# Patient Record
Sex: FEMALE | Race: WHITE | Employment: OTHER | URBAN - METROPOLITAN AREA
[De-identification: names, ages, dates, MRNs, and addresses within clinical notes are randomized per-mention and may not be internally consistent; named-entity substitution may affect disease eponyms.]

---

## 2018-03-20 PROCEDURE — 87510 GARDNER VAG DNA DIR PROBE: CPT | Performed by: OBSTETRICS & GYNECOLOGY

## 2018-03-20 PROCEDURE — 87480 CANDIDA DNA DIR PROBE: CPT | Performed by: OBSTETRICS & GYNECOLOGY

## 2018-03-20 PROCEDURE — 87660 TRICHOMONAS VAGIN DIR PROBE: CPT | Performed by: OBSTETRICS & GYNECOLOGY

## 2018-03-20 PROCEDURE — 87086 URINE CULTURE/COLONY COUNT: CPT | Performed by: OBSTETRICS & GYNECOLOGY

## 2018-07-13 PROCEDURE — 87045 FECES CULTURE AEROBIC BACT: CPT | Performed by: INTERNAL MEDICINE

## 2018-07-13 PROCEDURE — 87493 C DIFF AMPLIFIED PROBE: CPT | Performed by: INTERNAL MEDICINE

## 2018-07-13 PROCEDURE — 87046 STOOL CULTR AEROBIC BACT EA: CPT | Performed by: INTERNAL MEDICINE

## 2020-02-04 ENCOUNTER — APPOINTMENT (OUTPATIENT)
Dept: LAB | Age: 75
End: 2020-02-04
Attending: DERMATOLOGY
Payer: MEDICARE

## 2020-02-04 DIAGNOSIS — D48.5 NEOPLASM OF UNCERTAIN BEHAVIOR OF SKIN: ICD-10-CM

## 2020-02-04 PROCEDURE — 88342 IMHCHEM/IMCYTCHM 1ST ANTB: CPT

## 2020-02-04 PROCEDURE — 88341 IMHCHEM/IMCYTCHM EA ADD ANTB: CPT

## 2021-04-23 PROBLEM — I25.84 CORONARY ARTERY CALCIFICATION: Status: ACTIVE | Noted: 2021-04-23

## 2021-04-23 PROBLEM — I70.0 AORTIC ATHEROSCLEROSIS (HCC): Status: ACTIVE | Noted: 2021-04-23

## 2021-04-23 PROBLEM — I25.10 CORONARY ARTERY CALCIFICATION: Status: ACTIVE | Noted: 2021-04-23

## 2024-02-09 ENCOUNTER — LAB ENCOUNTER (OUTPATIENT)
Dept: LAB | Age: 79
End: 2024-02-09
Attending: OBSTETRICS & GYNECOLOGY
Payer: MEDICARE

## 2024-02-09 DIAGNOSIS — Z01.818 PREOP TESTING: ICD-10-CM

## 2024-02-09 LAB
ANTIBODY SCREEN: NEGATIVE
RH BLOOD TYPE: POSITIVE
RH BLOOD TYPE: POSITIVE

## 2024-02-09 PROCEDURE — 86900 BLOOD TYPING SEROLOGIC ABO: CPT

## 2024-02-09 PROCEDURE — 86901 BLOOD TYPING SEROLOGIC RH(D): CPT

## 2024-02-09 PROCEDURE — 86850 RBC ANTIBODY SCREEN: CPT

## 2024-02-13 ENCOUNTER — ANESTHESIA (OUTPATIENT)
Dept: SURGERY | Facility: HOSPITAL | Age: 79
End: 2024-02-13
Payer: MEDICARE

## 2024-02-13 ENCOUNTER — HOSPITAL ENCOUNTER (INPATIENT)
Facility: HOSPITAL | Age: 79
LOS: 1 days | Discharge: HOME | End: 2024-02-14
Attending: OBSTETRICS & GYNECOLOGY | Admitting: OBSTETRICS & GYNECOLOGY
Payer: MEDICARE

## 2024-02-13 ENCOUNTER — ANESTHESIA EVENT (OUTPATIENT)
Dept: SURGERY | Facility: HOSPITAL | Age: 79
End: 2024-02-13
Payer: MEDICARE

## 2024-02-13 DIAGNOSIS — Z01.818 PREOP TESTING: Primary | ICD-10-CM

## 2024-02-13 PROBLEM — C54.1 ENDOMETRIAL CANCER (HCC): Status: ACTIVE | Noted: 2024-02-13

## 2024-02-13 PROCEDURE — 07BC4ZZ EXCISION OF PELVIS LYMPHATIC, PERCUTANEOUS ENDOSCOPIC APPROACH: ICD-10-PCS | Performed by: OBSTETRICS & GYNECOLOGY

## 2024-02-13 PROCEDURE — 0UT9FZZ RESECTION OF UTERUS, VIA NATURAL OR ARTIFICIAL OPENING WITH PERCUTANEOUS ENDOSCOPIC ASSISTANCE: ICD-10-PCS | Performed by: OBSTETRICS & GYNECOLOGY

## 2024-02-13 PROCEDURE — 88341 IMHCHEM/IMCYTCHM EA ADD ANTB: CPT | Performed by: OBSTETRICS & GYNECOLOGY

## 2024-02-13 PROCEDURE — 88309 TISSUE EXAM BY PATHOLOGIST: CPT | Performed by: OBSTETRICS & GYNECOLOGY

## 2024-02-13 PROCEDURE — 88112 CYTOPATH CELL ENHANCE TECH: CPT | Performed by: OBSTETRICS & GYNECOLOGY

## 2024-02-13 PROCEDURE — 94799 UNLISTED PULMONARY SVC/PX: CPT

## 2024-02-13 PROCEDURE — 0UT24ZZ RESECTION OF BILATERAL OVARIES, PERCUTANEOUS ENDOSCOPIC APPROACH: ICD-10-PCS | Performed by: OBSTETRICS & GYNECOLOGY

## 2024-02-13 PROCEDURE — 88342 IMHCHEM/IMCYTCHM 1ST ANTB: CPT | Performed by: OBSTETRICS & GYNECOLOGY

## 2024-02-13 PROCEDURE — 8E0W4CZ ROBOTIC ASSISTED PROCEDURE OF TRUNK REGION, PERCUTANEOUS ENDOSCOPIC APPROACH: ICD-10-PCS | Performed by: OBSTETRICS & GYNECOLOGY

## 2024-02-13 PROCEDURE — 88307 TISSUE EXAM BY PATHOLOGIST: CPT | Performed by: OBSTETRICS & GYNECOLOGY

## 2024-02-13 PROCEDURE — 0UT74ZZ RESECTION OF BILATERAL FALLOPIAN TUBES, PERCUTANEOUS ENDOSCOPIC APPROACH: ICD-10-PCS | Performed by: OBSTETRICS & GYNECOLOGY

## 2024-02-13 RX ORDER — TRAMADOL HYDROCHLORIDE 50 MG/1
50 TABLET ORAL EVERY 6 HOURS PRN
Status: DISCONTINUED | OUTPATIENT
Start: 2024-02-13 | End: 2024-02-14

## 2024-02-13 RX ORDER — DEXAMETHASONE SODIUM PHOSPHATE 4 MG/ML
VIAL (ML) INJECTION AS NEEDED
Status: DISCONTINUED | OUTPATIENT
Start: 2024-02-13 | End: 2024-02-13 | Stop reason: SURG

## 2024-02-13 RX ORDER — ONDANSETRON 2 MG/ML
4 INJECTION INTRAMUSCULAR; INTRAVENOUS EVERY 6 HOURS PRN
Status: DISCONTINUED | OUTPATIENT
Start: 2024-02-13 | End: 2024-02-13 | Stop reason: HOSPADM

## 2024-02-13 RX ORDER — PHENYLEPHRINE HCL 10 MG/ML
VIAL (ML) INJECTION AS NEEDED
Status: DISCONTINUED | OUTPATIENT
Start: 2024-02-13 | End: 2024-02-13 | Stop reason: SURG

## 2024-02-13 RX ORDER — METOCLOPRAMIDE HYDROCHLORIDE 5 MG/ML
10 INJECTION INTRAMUSCULAR; INTRAVENOUS ONCE
Status: COMPLETED | OUTPATIENT
Start: 2024-02-13 | End: 2024-02-13

## 2024-02-13 RX ORDER — INDOCYANINE GREEN AND WATER 25 MG
KIT INJECTION AS NEEDED
Status: DISCONTINUED | OUTPATIENT
Start: 2024-02-13 | End: 2024-02-13 | Stop reason: HOSPADM

## 2024-02-13 RX ORDER — LIDOCAINE HYDROCHLORIDE 10 MG/ML
INJECTION, SOLUTION EPIDURAL; INFILTRATION; INTRACAUDAL; PERINEURAL AS NEEDED
Status: DISCONTINUED | OUTPATIENT
Start: 2024-02-13 | End: 2024-02-13 | Stop reason: SURG

## 2024-02-13 RX ORDER — BUPIVACAINE HYDROCHLORIDE 2.5 MG/ML
INJECTION, SOLUTION EPIDURAL; INFILTRATION; INTRACAUDAL AS NEEDED
Status: DISCONTINUED | OUTPATIENT
Start: 2024-02-13 | End: 2024-02-13 | Stop reason: HOSPADM

## 2024-02-13 RX ORDER — NALOXONE HYDROCHLORIDE 0.4 MG/ML
80 INJECTION, SOLUTION INTRAMUSCULAR; INTRAVENOUS; SUBCUTANEOUS AS NEEDED
Status: DISCONTINUED | OUTPATIENT
Start: 2024-02-13 | End: 2024-02-13 | Stop reason: HOSPADM

## 2024-02-13 RX ORDER — HYDROMORPHONE HYDROCHLORIDE 1 MG/ML
0.4 INJECTION, SOLUTION INTRAMUSCULAR; INTRAVENOUS; SUBCUTANEOUS EVERY 2 HOUR PRN
Status: DISCONTINUED | OUTPATIENT
Start: 2024-02-13 | End: 2024-02-14

## 2024-02-13 RX ORDER — IBUPROFEN 600 MG/1
600 TABLET ORAL EVERY 6 HOURS PRN
Status: DISCONTINUED | OUTPATIENT
Start: 2024-02-13 | End: 2024-02-14

## 2024-02-13 RX ORDER — CEFAZOLIN SODIUM IN 0.9 % NACL 3 G/100 ML
3 INTRAVENOUS SOLUTION, PIGGYBACK (ML) INTRAVENOUS EVERY 8 HOURS
Qty: 200 ML | Refills: 0 | Status: COMPLETED | OUTPATIENT
Start: 2024-02-13 | End: 2024-02-14

## 2024-02-13 RX ORDER — ONDANSETRON 2 MG/ML
INJECTION INTRAMUSCULAR; INTRAVENOUS AS NEEDED
Status: DISCONTINUED | OUTPATIENT
Start: 2024-02-13 | End: 2024-02-13 | Stop reason: SURG

## 2024-02-13 RX ORDER — ENOXAPARIN SODIUM 100 MG/ML
40 INJECTION SUBCUTANEOUS DAILY
Status: DISCONTINUED | OUTPATIENT
Start: 2024-02-14 | End: 2024-02-14

## 2024-02-13 RX ORDER — MORPHINE SULFATE 4 MG/ML
2 INJECTION, SOLUTION INTRAMUSCULAR; INTRAVENOUS EVERY 10 MIN PRN
Status: DISCONTINUED | OUTPATIENT
Start: 2024-02-13 | End: 2024-02-13 | Stop reason: HOSPADM

## 2024-02-13 RX ORDER — SODIUM CHLORIDE, SODIUM LACTATE, POTASSIUM CHLORIDE, CALCIUM CHLORIDE 600; 310; 30; 20 MG/100ML; MG/100ML; MG/100ML; MG/100ML
INJECTION, SOLUTION INTRAVENOUS CONTINUOUS
Status: DISCONTINUED | OUTPATIENT
Start: 2024-02-13 | End: 2024-02-13 | Stop reason: HOSPADM

## 2024-02-13 RX ORDER — HYDROMORPHONE HYDROCHLORIDE 1 MG/ML
0.2 INJECTION, SOLUTION INTRAMUSCULAR; INTRAVENOUS; SUBCUTANEOUS EVERY 2 HOUR PRN
Status: DISCONTINUED | OUTPATIENT
Start: 2024-02-13 | End: 2024-02-14

## 2024-02-13 RX ORDER — METOPROLOL TARTRATE 1 MG/ML
2.5 INJECTION, SOLUTION INTRAVENOUS ONCE
Status: DISCONTINUED | OUTPATIENT
Start: 2024-02-13 | End: 2024-02-13 | Stop reason: HOSPADM

## 2024-02-13 RX ORDER — HYDROMORPHONE HYDROCHLORIDE 1 MG/ML
0.8 INJECTION, SOLUTION INTRAMUSCULAR; INTRAVENOUS; SUBCUTANEOUS EVERY 2 HOUR PRN
Status: DISCONTINUED | OUTPATIENT
Start: 2024-02-13 | End: 2024-02-14

## 2024-02-13 RX ORDER — HYDROMORPHONE HYDROCHLORIDE 1 MG/ML
0.2 INJECTION, SOLUTION INTRAMUSCULAR; INTRAVENOUS; SUBCUTANEOUS EVERY 5 MIN PRN
Status: DISCONTINUED | OUTPATIENT
Start: 2024-02-13 | End: 2024-02-13 | Stop reason: HOSPADM

## 2024-02-13 RX ORDER — CEFAZOLIN SODIUM IN 0.9 % NACL 3 G/100 ML
3 INTRAVENOUS SOLUTION, PIGGYBACK (ML) INTRAVENOUS ONCE
Status: COMPLETED | OUTPATIENT
Start: 2024-02-13 | End: 2024-02-13

## 2024-02-13 RX ORDER — SODIUM CHLORIDE, SODIUM LACTATE, POTASSIUM CHLORIDE, CALCIUM CHLORIDE 600; 310; 30; 20 MG/100ML; MG/100ML; MG/100ML; MG/100ML
INJECTION, SOLUTION INTRAVENOUS CONTINUOUS
Status: DISCONTINUED | OUTPATIENT
Start: 2024-02-13 | End: 2024-02-14

## 2024-02-13 RX ORDER — SIMETHICONE 80 MG
80 TABLET,CHEWABLE ORAL 3 TIMES DAILY PRN
Status: DISCONTINUED | OUTPATIENT
Start: 2024-02-13 | End: 2024-02-14

## 2024-02-13 RX ORDER — MORPHINE SULFATE 10 MG/ML
6 INJECTION, SOLUTION INTRAMUSCULAR; INTRAVENOUS EVERY 10 MIN PRN
Status: DISCONTINUED | OUTPATIENT
Start: 2024-02-13 | End: 2024-02-13 | Stop reason: HOSPADM

## 2024-02-13 RX ORDER — HYDROMORPHONE HYDROCHLORIDE 1 MG/ML
0.6 INJECTION, SOLUTION INTRAMUSCULAR; INTRAVENOUS; SUBCUTANEOUS EVERY 5 MIN PRN
Status: DISCONTINUED | OUTPATIENT
Start: 2024-02-13 | End: 2024-02-13 | Stop reason: HOSPADM

## 2024-02-13 RX ORDER — ONDANSETRON 4 MG/1
4 TABLET, FILM COATED ORAL EVERY 8 HOURS PRN
Status: DISCONTINUED | OUTPATIENT
Start: 2024-02-13 | End: 2024-02-14

## 2024-02-13 RX ORDER — FAMOTIDINE 10 MG/ML
20 INJECTION, SOLUTION INTRAVENOUS ONCE
Status: COMPLETED | OUTPATIENT
Start: 2024-02-13 | End: 2024-02-13

## 2024-02-13 RX ORDER — HYDROCODONE BITARTRATE AND ACETAMINOPHEN 5; 325 MG/1; MG/1
1 TABLET ORAL EVERY 6 HOURS PRN
Status: DISCONTINUED | OUTPATIENT
Start: 2024-02-13 | End: 2024-02-13

## 2024-02-13 RX ORDER — FAMOTIDINE 20 MG/1
20 TABLET, FILM COATED ORAL ONCE
Status: COMPLETED | OUTPATIENT
Start: 2024-02-13 | End: 2024-02-13

## 2024-02-13 RX ORDER — GLYCOPYRROLATE 0.2 MG/ML
INJECTION, SOLUTION INTRAMUSCULAR; INTRAVENOUS AS NEEDED
Status: DISCONTINUED | OUTPATIENT
Start: 2024-02-13 | End: 2024-02-13 | Stop reason: SURG

## 2024-02-13 RX ORDER — EPHEDRINE SULFATE 50 MG/ML
INJECTION, SOLUTION INTRAVENOUS AS NEEDED
Status: DISCONTINUED | OUTPATIENT
Start: 2024-02-13 | End: 2024-02-13 | Stop reason: SURG

## 2024-02-13 RX ORDER — ROCURONIUM BROMIDE 10 MG/ML
INJECTION, SOLUTION INTRAVENOUS AS NEEDED
Status: DISCONTINUED | OUTPATIENT
Start: 2024-02-13 | End: 2024-02-13 | Stop reason: SURG

## 2024-02-13 RX ORDER — MORPHINE SULFATE 4 MG/ML
4 INJECTION, SOLUTION INTRAMUSCULAR; INTRAVENOUS EVERY 10 MIN PRN
Status: DISCONTINUED | OUTPATIENT
Start: 2024-02-13 | End: 2024-02-13 | Stop reason: HOSPADM

## 2024-02-13 RX ORDER — ACETAMINOPHEN 500 MG
1000 TABLET ORAL ONCE
Status: COMPLETED | OUTPATIENT
Start: 2024-02-13 | End: 2024-02-13

## 2024-02-13 RX ORDER — DIPHENHYDRAMINE HYDROCHLORIDE 50 MG/ML
12.5 INJECTION INTRAMUSCULAR; INTRAVENOUS EVERY 4 HOURS PRN
Status: DISCONTINUED | OUTPATIENT
Start: 2024-02-13 | End: 2024-02-14

## 2024-02-13 RX ORDER — METOPROLOL SUCCINATE 25 MG/1
25 TABLET, EXTENDED RELEASE ORAL NIGHTLY
Status: DISCONTINUED | OUTPATIENT
Start: 2024-02-13 | End: 2024-02-14

## 2024-02-13 RX ORDER — ACETAMINOPHEN 325 MG/1
650 TABLET ORAL EVERY 4 HOURS PRN
Status: DISCONTINUED | OUTPATIENT
Start: 2024-02-13 | End: 2024-02-14

## 2024-02-13 RX ORDER — HYDROMORPHONE HYDROCHLORIDE 1 MG/ML
0.4 INJECTION, SOLUTION INTRAMUSCULAR; INTRAVENOUS; SUBCUTANEOUS EVERY 5 MIN PRN
Status: DISCONTINUED | OUTPATIENT
Start: 2024-02-13 | End: 2024-02-13 | Stop reason: HOSPADM

## 2024-02-13 RX ORDER — ONDANSETRON 2 MG/ML
4 INJECTION INTRAMUSCULAR; INTRAVENOUS EVERY 8 HOURS PRN
Status: DISCONTINUED | OUTPATIENT
Start: 2024-02-13 | End: 2024-02-14

## 2024-02-13 RX ORDER — DOCUSATE SODIUM 100 MG/1
100 CAPSULE, LIQUID FILLED ORAL 2 TIMES DAILY
Status: DISCONTINUED | OUTPATIENT
Start: 2024-02-14 | End: 2024-02-14

## 2024-02-13 RX ORDER — METOCLOPRAMIDE HYDROCHLORIDE 5 MG/ML
10 INJECTION INTRAMUSCULAR; INTRAVENOUS EVERY 8 HOURS PRN
Status: DISCONTINUED | OUTPATIENT
Start: 2024-02-13 | End: 2024-02-13 | Stop reason: HOSPADM

## 2024-02-13 RX ORDER — METOCLOPRAMIDE 10 MG/1
10 TABLET ORAL ONCE
Status: COMPLETED | OUTPATIENT
Start: 2024-02-13 | End: 2024-02-13

## 2024-02-13 RX ADMIN — DEXAMETHASONE SODIUM PHOSPHATE 4 MG: 4 MG/ML VIAL (ML) INJECTION at 07:37:00

## 2024-02-13 RX ADMIN — PHENYLEPHRINE HCL 100 MCG: 10 MG/ML VIAL (ML) INJECTION at 08:09:00

## 2024-02-13 RX ADMIN — ROCURONIUM BROMIDE 20 MG: 10 INJECTION, SOLUTION INTRAVENOUS at 08:56:00

## 2024-02-13 RX ADMIN — CEFAZOLIN SODIUM IN 0.9 % NACL 3 G: 3 G/100 ML INTRAVENOUS SOLUTION, PIGGYBACK (ML) INTRAVENOUS at 07:44:00

## 2024-02-13 RX ADMIN — LIDOCAINE HYDROCHLORIDE 50 MG: 10 INJECTION, SOLUTION EPIDURAL; INFILTRATION; INTRACAUDAL; PERINEURAL at 07:37:00

## 2024-02-13 RX ADMIN — ROCURONIUM BROMIDE 20 MG: 10 INJECTION, SOLUTION INTRAVENOUS at 08:01:00

## 2024-02-13 RX ADMIN — ONDANSETRON 4 MG: 2 INJECTION INTRAMUSCULAR; INTRAVENOUS at 09:29:00

## 2024-02-13 RX ADMIN — EPHEDRINE SULFATE 10 MG: 50 INJECTION, SOLUTION INTRAVENOUS at 08:21:00

## 2024-02-13 RX ADMIN — SODIUM CHLORIDE, SODIUM LACTATE, POTASSIUM CHLORIDE, CALCIUM CHLORIDE: 600; 310; 30; 20 INJECTION, SOLUTION INTRAVENOUS at 09:47:00

## 2024-02-13 RX ADMIN — SODIUM CHLORIDE, SODIUM LACTATE, POTASSIUM CHLORIDE, CALCIUM CHLORIDE: 600; 310; 30; 20 INJECTION, SOLUTION INTRAVENOUS at 07:36:00

## 2024-02-13 RX ADMIN — GLYCOPYRROLATE 0.2 MG: 0.2 INJECTION, SOLUTION INTRAMUSCULAR; INTRAVENOUS at 07:37:00

## 2024-02-13 RX ADMIN — PHENYLEPHRINE HCL 100 MCG: 10 MG/ML VIAL (ML) INJECTION at 09:04:00

## 2024-02-13 RX ADMIN — SODIUM CHLORIDE, SODIUM LACTATE, POTASSIUM CHLORIDE, CALCIUM CHLORIDE: 600; 310; 30; 20 INJECTION, SOLUTION INTRAVENOUS at 09:06:00

## 2024-02-13 RX ADMIN — ROCURONIUM BROMIDE 50 MG: 10 INJECTION, SOLUTION INTRAVENOUS at 07:37:00

## 2024-02-13 RX ADMIN — PHENYLEPHRINE HCL 100 MCG: 10 MG/ML VIAL (ML) INJECTION at 08:44:00

## 2024-02-13 RX ADMIN — EPHEDRINE SULFATE 10 MG: 50 INJECTION, SOLUTION INTRAVENOUS at 07:55:00

## 2024-02-13 NOTE — PLAN OF CARE
Pt received from OR vis cart,, a/o x 4, vss. Discussed POC, offered emotional support. Encouraged pt to use call light for assistance. Pt with bennett catheter, francisco pad in place. Call light and belongings with in reach, side rails up x 3, bed alarm on. Encouraged pt to sit in chair for meals.   Problem: Patient Centered Care  Goal: Patient preferences are identified and integrated in the patient's plan of care  Description: Interventions:  - What would you like us to know as we care for you?   - Provide timely, complete, and accurate information to patient/family  - Incorporate patient and family knowledge, values, beliefs, and cultural backgrounds into the planning and delivery of care  - Encourage patient/family to participate in care and decision-making at the level they choose  - Honor patient and family perspectives and choices  Outcome: Progressing     Problem: Patient/Family Goals  Goal: Patient/Family Long Term Goal  Description: Patient's Long Term Goal: - Work with treatment team to develop plan of care  - Utilize medications as appropriate, take prescribed medications as instructed  - Continue following up post discharge as instructed with appropriate physicians     Interventions:  -   - See additional Care Plan goals for specific interventions  Outcome: Progressing  Goal: Patient/Family Short Term Goal  Description: Patient's Short Term Goal: - Work with treatment team to develop pain control plan  - Utilize PRN medications appropriately  - Use non-pharmacological pain control techniques (deep breathing, guided-imagery, heat/ice)      Interventions:   -   - See additional Care Plan goals for specific interventions  Outcome: Progressing

## 2024-02-13 NOTE — OPERATIVE REPORT
[facility]    PATIENT'S NAME: CASPER NAVARRETE   ATTENDING PHYSICIAN: Avery Mora MD   OPERATING PHYSICIAN: Avery Mora MD   PATIENT ACCOUNT#:   [id number]    LOCATION:  [address] [place] [address]  MEDICAL RECORD #:   [id number]       YOB: 1945  ADMISSION DATE:       02/13/2024      OPERATION DATE:  02/13/2024    OPERATIVE REPORT    PREOPERATIVE DIAGNOSIS:    1.   Endometrial cancer, grade 1.  2.   Morbid obesity.  Body mass index equal to 43.  3.   Atrial fibrillation, on chronic anticoagulation.  POSTOPERATIVE DIAGNOSIS:    1.   Endometrial cancer, grade 1.  2.   Morbid obesity.  Body mass index equal to 43.  3.   Atrial fibrillation, on chronic anticoagulation.  PROCEDURE:  Robotic-assisted total laparoscopic hysterectomy and bilateral salpingo-oophorectomy, bilateral cervical injection of ICG dye, and bilateral sentinel lymph node dissection.    ASSISTANTS:  Daphne Reddy PA-C and Mariama Ramirez CSA    INTRAVENOUS FLUIDS:  1200 mL.    URINE OUTPUT:  400 mL.    ESTIMATED BLOOD LOSS:  50 mL.    DRAINS:  Jiménez catheter.    COMPLICATIONS:  None.    CONDITION:  Stable, to recovery room.    INDICATIONS:  This is an 78-year-old female who had postmenopausal bleeding.  She had an ultrasound which showed diffusely heterogenous uterus.  Endometrial biopsy showed a FIGO grade 1 endometrial cavity.  The patient was subsequently referred to me.  She has atrial fibrillation and left atrial appendage thrombus and is on Eliquis.  I counseled the patient on options.  As she was thought to be a good candidate for minimally invasive approach, she wanted to proceed with definitive surgical management.  She was counseled on risks, benefits, and alternatives, indications.  Informed consent was obtained.  The patient underwent preoperative medical and cardiac clearance, and her Eliquis was held for 5 days prior to the surgery.    FINDINGS:  Laparoscopic evaluation showed upper abdomen had no significant  abnormalities.  The uterus was retroverted.  It was approximately 7 weeks in size.  The ovaries were age-appropriately atrophic.  The patient had good sentinel lymph node mapping bilaterally in the right obturator space and in the left internal iliac region.  The right obturator lymph nodes were fairly bulky although they did not have overt disease.  There was no evidence of any other peritoneal disease.  The patient did have evidence of colonic diverticulosis with some thickening of the colon and some small areas of outpouching with no active infection seen.     OPERATIVE TECHNIQUE:  The patient was taken to the operating room.  She was given general anesthesia.  She was prepped and draped in usual sterile fashion.  Jiménez catheter was inserted.  Cervix was visualized and dilute solution of ICG dye was injected bilaterally at 3 o'clock and 9 o'clock and more superficially at 5 and 7 o'clock.  A medium VCare was introduced under direct visualization after pneumoperitoneum was created in the left upper quadrant at Oneal's point with a Veress needle.  Initial pressure was 3 mmHg.  Abdomen was insufflated with 4 L of CO2 gas.  A 5 mm AirSeal port was placed.  Position was confirmed.  Robotic trocars were placed 3 cm above the umbilicus in the midline, 2 on the left 10 and 20 cm apart and 1 on the right 10 cm apart, and then a 5 mm assistant port in the right lateral midquadrant.  The patient was placed in Trendelenburg position.  The bowel was positioned out of the way.  The balloon from the uterine manipulator was seen in the anterior area and had traversed through the anterior aspect of the uterus.  It was then repositioned under direct visualization and once we accounted for the retroverted nature of the uterus, we were able to place it successfully without any issues for the remainder of the case.  The robot was docked.  Standard instrumentation applied.  Some congenital adhesions of the sigmoid colon to the pelvic  sidewall were taken down.  The round ligaments on both sides were cauterized and cut.  The peritoneum between the round ligament and infundibulopelvic ligament was divided.  Retroperitoneal spaces were opened.  Ureters were identified.  External, internal, and obturator lymph node bundles were opened.  Firefly was used along with sensitive mode to identify the sentinel lymph nodes.  Excellent mapping was seen on both sides.  On the right side, there was an obturator lymph node bundle that was seen although there was a bulky lymph node that was distal to it that was all part of it that was fairly enlarged.  Therefore, this entire lymph node bundle was excised.  Vital structures were seen and preserved.  The lymph nodes were placed in their respective ipsilateral paracolic space.  On the left side, there was good mapping that was seen in the left internal iliac region that extended up to the bifurcation with the common iliac.  This lymph node was excised in its entirety and also placed in the paracolic space.  The infundibulopelvic ligaments were skeletonized, cauterized, and cut.  Anterior leaf of the broad ligament was then taken down.  Bladder was dissected off the pubocervical fascia.  Some mild to moderate scarring was seen in this area of unclear etiology although we were able to work around it and dissect the bladder off completely.  Posterior peritoneum was skeletonized.  Uterine vessels were skeletonized, cauterized, and cut.  Descending branches were similarly cauterized and cut.  Parametria was taken down up to the level of the VCare cup.  Posterior aspect of the VCare cup over the peritoneum was incised.  Colpotomy was made anteriorly, carried down circumferentially.  The specimen was delivered through the vagina.  The sentinel lymph nodes were also delivered through the vagina.  The vagina was then closed with 0 180 V-Loc sutures starting from right lateral edge and working our way to the left then back  to the right side.  Transvaginal examination showed excellent closure of the vagina.  Due to the initial anterior displacement of the manipulator, I did backfill the bladder with approximately 120 mL of saline.  Clear urine was seen, and there was no evidence of any disruption of the bladder or any areas of thinning.  Urine was drained.  Pelvis was irrigated.  No ongoing bleeding was seen.  Excellent hemostasis was seen.  Surgicel was placed in areas of lymph node dissection and along the vaginal cuff.  Robotic instruments were removed.  Robot was undocked.  Cannulas were removed under direct visualization.  AirSeal was used to deflate the abdomen.  Incisions were closed with subcuticular 4-0 Vicryl suture along with Dermabond.  Transvaginal examination showed no evidence of lacerations.  Clear urine was seen in the catheter.  Approximately 30 mL of Marcaine was injected over the incision sites.      I was present and scrubbed for the entire procedure.  Daphne Reddy PA-C, served as assistant and was invaluable in providing traction, countertraction, exposure, and was needed for the safety and efficiency of the procedure.  Patient was extubated and taken to recovery room in satisfactory condition.     Dictated By Avery Mora MD  d: 02/13/2024 09:45:48  t: 02/13/2024 13:50:58  Job [id number]  /    cc: Avery Mora MD

## 2024-02-13 NOTE — ANESTHESIA POSTPROCEDURE EVALUATION
Patient: Carolynn David    Procedure Summary       Date: 02/13/24 Room / Location: [facility] MAIN OR  / [facility] MAIN OR    Anesthesia Start: 0735 Anesthesia Stop:     Procedures:       Robotic assisted total laparoscopic hysterectomy, bilateral salpingo-oophorectomy, staging (Abdomen)      XI ROBOT-ASSISTED LAPAROSCOPIC OVARIAN CYSTECTOMY/ SALPINGO-OOPHORECTOMY (Bilateral: Abdomen) Diagnosis: (Endometrial cancer)    Surgeons: Avery Mora MD Anesthesiologist: Rayo Barrios MD    Anesthesia Type: general ASA Status: 3            Anesthesia Type: general    Vitals Value Taken Time   /60 02/13/24 0950   Temp 97.1 °F (36.2 °C) 02/13/24 0949   Pulse 73 02/13/24 0950   Resp 18 02/13/24 0950   SpO2 96 % 02/13/24 0950   Vitals shown include unfiled device data.    [facility] AN Post Evaluation:   Patient Evaluated in PACU  Patient Participation: complete - patient participated  Level of Consciousness: sleepy but conscious  Pain Score: 0  Pain Management: adequate  Airway Patency:patent  Dental exam unchanged from preop  Yes    Cardiovascular Status: stable and acceptable  Respiratory Status: acceptable and room air  Postoperative Hydration acceptable      KOKO GARAY CRNA  2/13/2024 9:50 AM

## 2024-02-13 NOTE — ANESTHESIA PREPROCEDURE EVALUATION
Anesthesia PreOp Note    HPI:     Carolynn David is a 78 year old female who presents for preoperative consultation requested by: Avery Mora MD    Date of Surgery: 2/13/2024    Procedure(s):  Robotic assisted total laparoscopic hysterectomy, bilateral salpingo-oophorectomy, staging, possible exploratory laparotomy  XI ROBOT-ASSISTED LAPAROSCOPIC OVARIAN CYSTECTOMY/ SALPINGO-OOPHORECTOMY  Indication: Endometrial cancer    Relevant Problems   No relevant active problems       NPO:  Last Liquid Consumption Date: 02/12/24  Last Liquid Consumption Time: 0400 (sips of water)  Last Solid Consumption Date: 02/12/24  Last Solid Consumption Time: 0800  Last Liquid Consumption Date: 02/12/24          History Review:  Patient Active Problem List    Diagnosis Date Noted    Aortic atherosclerosis (HCC) 04/23/2021    Coronary artery calcification 04/23/2021    Arthritis 04/05/2016    Postmenopausal bleeding 5/16 simple hyperplasia no atypia, & polyps 04/05/2016    Actinic keratosis 09/01/2015    Screening, anemia, deficiency, iron 07/22/2013    Screening for genitourinary condition 07/22/2013    Lipoatrophy 05/24/2012    Atrial fibrillation (HCC) 08/27/2008    Benign hypertensive heart disease without heart failure 08/27/2008    Dyslipidemia, goal LDL below 100 08/27/2008    Obesity, unspecified 08/27/2008    Obstructive sleep apnea syndrome 07/22/2008    Alopecia 07/22/2008       Past Medical History:   Diagnosis Date    ATRIAL FIBRILLATION     Back problem     lumbar disc    Carcinoma in situ in rectal polyp 08/2020    Diarrhea     GERD     Pt not currently on medication    HYPERTENSION     IBS (irritable bowel syndrome)     Incontinence     urine    MENOPAUSE     Migraines     Obesity, unspecified     LAZARO (obstructive sleep apnea)     Osteoarthritis     Skin cancer     Unspecified essential hypertension     Unspecified sleep apnea     Vertigo     Visual impairment     glasses    Vitamin B 12 deficiency        Past  Surgical History:   Procedure Laterality Date    CHOLECYSTECTOMY  4/16    with negative cholangiogram    COLONOSCOPY  08/11/2020    8 cm lobulated villous appearing rectal polyp (pathology showed focus of adenocarcinoma), diverticulosis, random bx taken    COLONOSCOPY & POLYPECTOMY  9/8/05    diverticulosis, adenoma    COLONOSCOPY,DIAGNOSTIC  8/9/11    diverticulosis, random colon bx negativen;    D & C  2008    endometrial polyp    D & C  1990    ENDOSCOPIC ULTRASOUND EXAM  4/10/16    acute cholecystitis without evidence of biliary ductal dilation  or CBD stones    HYSTEROSCOPY      OTHER SURGICAL HISTORY  2002    left knee torn meniscus    OTHER SURGICAL HISTORY  1989    sinus polyps    OTHER SURGICAL HISTORY  1965    hernia    OTHER SURGICAL HISTORY      hysteroscopy and removal of uterine polyp    OTHER SURGICAL HISTORY  5/16    endometrial polyps    SIGMOIDOSCOPY,DIAGNOSTIC  01/12/2021    Rectal scar with some residual polyp (adenoma) at the margin removed    TUBAL LIGATION  1969    UPPER GI ENDOSCOPY,DIAGNOSIS  9/8/05    wnl       Medications Prior to Admission   Medication Sig Dispense Refill Last Dose    apixaban 5 MG Oral Tab Take 1 tablet (5 mg total) by mouth 2 (two) times daily.   2/7/2024 at 2200    metoprolol succinate 25 MG Oral Tablet 24 Hr TAKE ONE TABLET BY MOUTH ONCE DAILY (Patient taking differently: Take 1 tablet (25 mg total) by mouth nightly. TAKE ONE TABLET BY MOUTH ONCE DAILY) 90 tablet 3 2/12/2024 at 2200    DIPHENOXYLATE-ATROPINE 2.5-0.025 MG Oral Tab TAKE ONE TABLET BY MOUTH TWICE DAILY AS NEEDED FOR DIARRHEA 180 tablet 3 Past Week    CHOLESTYRAMINE 4 GM/DOSE Oral Powder USE ONE SCOOP WITH JUICE AT BREAKFAST AND ADJUST DOSE AS NEEDED (Patient taking differently: Take 0.125 packets (0.5 g total) by mouth daily. USE ONE SCOOP WITH JUICE AT BREAKFAST AND ADJUST DOSE AS NEEDED) 378 g 3 2/12/2024 at 0800    TYLENOL 8 HOUR 650 MG OR TBCR Take by mouth.   2/10/2024     Current  Facility-Administered Medications Ordered in Epic   Medication Dose Route Frequency Provider Last Rate Last Admin    lactated ringers infusion   Intravenous Continuous Avery Mora MD 20 mL/hr at 02/13/24 0647 New Bag at 02/13/24 0647    metoprolol tartrate (Lopressor) tab 25 mg  25 mg Oral Once PRN Avery Mora MD        ceFAZolin (Ancef) 3 g in sodium chloride 0.9% 100mL IVPB premix  3 g Intravenous Once Avery Mora MD         No current [facility]-ordered outpatient medications on file.       Allergies   Allergen Reactions    Cortisone OTHER (SEE COMMENTS)     Flu like symptoms    Tuberculin Ppd OTHER (SEE COMMENTS)     Severe flu like symptoms    Wine  [Alcohol] SWELLING    Zoster Vaccine Live OTHER (SEE COMMENTS)    Protonix [Pantoprazole] DIZZINESS     vision changes    Celebrex [Celecoxib] DIARRHEA    Codeine [Opioid Analgesics] OTHER (SEE COMMENTS)     Face went numb    Rythmol [Propafenone Hcl] UNKNOWN     Patient can't recall reaction       Family History   Problem Relation Age of Onset    Stroke Mother     Heart Disease Mother         cvd    Heart Disorder Father         mitral valve - from rhumatic fever    Gastro-Intestinal Disorder Daughter         hep c    Renal Disease Daughter         kidney stones    Heart Disease Maternal Grandfather         stroke    Infectious Disease Paternal Grandmother         poss. flu?    Cancer Paternal Grandfather         throat    Hypertension Brother     Seizure Disorder Brother         sleep apnea    Heart Disease Sister         PE    Heart Disease Daughter         pulm embolism    Hormone Disorder Daughter         obese    Cancer Other         M-AUNT-BREAST    Other (Emboli) Sister         Multiple    Other (Sleep Apnea) Brother         Severe    Breast Cancer Maternal Aunt 55        Age at dx 50's     Social History     Socioeconomic History    Marital status:    Tobacco Use    Smoking status: Never    Smokeless tobacco: Never   Vaping Use    Vaping Use:  Never used   Substance and Sexual Activity    Alcohol use: No    Drug use: No       Available pre-op labs reviewed.             Vital Signs:  Body mass index is 43.14 kg/m².   height is 1.676 m (5' 6\") and weight is 121.2 kg (267 lb 4.8 oz). Her oral temperature is 97.4 °F (36.3 °C). Her blood pressure is 123/76 and her pulse is 87. Her respiration is 20 and oxygen saturation is 96%.   Vitals:    02/08/24 1159 02/13/24 0607   BP:  123/76   Pulse:  87   Resp:  20   Temp:  97.4 °F (36.3 °C)   TempSrc:  Oral   SpO2:  96%   Weight: 122.5 kg (270 lb) 121.2 kg (267 lb 4.8 oz)   Height: 1.676 m (5' 6\") 1.676 m (5' 6\")        Anesthesia Evaluation     Patient summary reviewed and Nursing notes reviewed    Airway   Mallampati: II  TM distance: >3 FB  Neck ROM: full  Dental      Pulmonary - negative ROS and normal exam    breath sounds clear to auscultation  (+) sleep apnea  Cardiovascular - normal exam  Exercise tolerance: good  (+) hypertension, CAD, dysrhythmias    NYHA Classification: I    Neuro/Psych - negative ROS     GI/Hepatic/Renal - negative ROS     Endo/Other - negative ROS   Abdominal  - normal exam     Other findings: Upper right molar a temporary tooth            Anesthesia Plan:   ASA:  3  Plan:   General  Monitors and Lines:   ALETHA  Informed Consent Plan and Risks Discussed With:  Patient  Discussed plan with:  Attending      I have informed Carolynn David and/or legal guardian or family member of the nature of the anesthetic plan, benefits, risks including possible dental damage if relevant, major complications, and any alternative forms of anesthetic management.   All of the patient's questions were answered to the best of my ability. The patient desires the anesthetic management as planned.  HERNÁN LOVE MD  2/13/2024 7:19 AM  Present on Admission:  **None**

## 2024-02-13 NOTE — H&P
DM Hospitalist H&P       CC: No chief complaint on file.       PCP: Joey Delgado MD    Date of Admission: 2/13/2024  5:37 AM    ASSESSMENT / PLAN:       Ms. David is an 79 yo F with PMH of Afib on Eliquis, HTN, LAZARO and endometrial CA who presented for hysterectomy, BSO.    Endometrial CA  S/p robotic assisted total laparoscopic hysterectomy, BSO  - PRN pain meds, Transition to PO when able, wants to avoid narcotics given codeine allergy  - monitor for acute blood loss anemia  - as per gyn onc     Afib  - off Eliquis   - continue metoprolol  - d/w gyn onc, plan to hold Eliquis for 2 weeks, plan to start prophylactic lovenox tomorrow then transition to therapeutic Lovenox in a few days if Hg stable    HTN  - BP stable    LAZARO  - CPAP    FN:  - IVF:  - Diet: ADAT    DVT Prophy: SCD  Lines: PIV    Dispo: pending clinical course    Outpatient records or previous hospital records reviewed.     Further recommendations pending patient's clinical course.  [facility] hospitalist to continue to follow patient while in house    Patient and/or patient's family given opportunity to ask questions and note understanding and agreeing with therapeutic plan as outlined    Mayra Marion MD  [facility] [place] Hospitalist  Answering Service number: [phone]    HPI     History of Present Illness:     Ms. David is an 79 yo F with PMH of Afib on Eliquis, HTN, LAZARO and endometrial CA who presented for hysterectomy, BSO. Patient seen post op. Complains of mild nausea, no CP, SOB, vomiting. Mild abdominal soreness.     PMH  Past Medical History:   Diagnosis Date    ATRIAL FIBRILLATION     Back problem     lumbar disc    Carcinoma in situ in rectal polyp 08/2020    Diarrhea     GERD     Pt not currently on medication    HYPERTENSION     IBS (irritable bowel syndrome)     Incontinence     urine    MENOPAUSE     Migraines     Obesity, unspecified     LAZARO (obstructive sleep apnea)     Osteoarthritis     Skin cancer     Unspecified essential  hypertension     Unspecified sleep apnea     Vertigo     Visual impairment     glasses    Vitamin B 12 deficiency         PSH  Past Surgical History:   Procedure Laterality Date    CHOLECYSTECTOMY  4/16    with negative cholangiogram    COLONOSCOPY  08/11/2020    8 cm lobulated villous appearing rectal polyp (pathology showed focus of adenocarcinoma), diverticulosis, random bx taken    COLONOSCOPY & POLYPECTOMY  9/8/05    diverticulosis, adenoma    COLONOSCOPY,DIAGNOSTIC  8/9/11    diverticulosis, random colon bx negativen;    D & C  2008    endometrial polyp    D & C  1990    ENDOSCOPIC ULTRASOUND EXAM  4/10/16    acute cholecystitis without evidence of biliary ductal dilation  or CBD stones    HYSTEROSCOPY      OTHER SURGICAL HISTORY  2002    left knee torn meniscus    OTHER SURGICAL HISTORY  1989    sinus polyps    OTHER SURGICAL HISTORY  1965    hernia    OTHER SURGICAL HISTORY      hysteroscopy and removal of uterine polyp    OTHER SURGICAL HISTORY  5/16    endometrial polyps    SIGMOIDOSCOPY,DIAGNOSTIC  01/12/2021    Rectal scar with some residual polyp (adenoma) at the margin removed    TUBAL LIGATION  1969    UPPER GI ENDOSCOPY,DIAGNOSIS  9/8/05    wnl        ALL:  Allergies   Allergen Reactions    Cortisone OTHER (SEE COMMENTS)     Flu like symptoms    Tuberculin Ppd OTHER (SEE COMMENTS)     Severe flu like symptoms    Wine  [Alcohol] SWELLING    Zoster Vaccine Live OTHER (SEE COMMENTS)    Protonix [Pantoprazole] DIZZINESS     vision changes    Celebrex [Celecoxib] DIARRHEA    Codeine [Opioid Analgesics] OTHER (SEE COMMENTS)     Face went numb    Rythmol [Propafenone Hcl] UNKNOWN     Patient can't recall reaction        Home Medications:  Outpatient Medications Marked as Taking for the 2/13/24 encounter (Hospital Encounter)   Medication Sig Dispense Refill    apixaban 5 MG Oral Tab Take 1 tablet (5 mg total) by mouth 2 (two) times daily.      metoprolol succinate 25 MG Oral Tablet 24 Hr TAKE ONE TABLET BY  MOUTH ONCE DAILY (Patient taking differently: Take 1 tablet (25 mg total) by mouth nightly. TAKE ONE TABLET BY MOUTH ONCE DAILY) 90 tablet 3    DIPHENOXYLATE-ATROPINE 2.5-0.025 MG Oral Tab TAKE ONE TABLET BY MOUTH TWICE DAILY AS NEEDED FOR DIARRHEA 180 tablet 3    CHOLESTYRAMINE 4 GM/DOSE Oral Powder USE ONE SCOOP WITH JUICE AT BREAKFAST AND ADJUST DOSE AS NEEDED (Patient taking differently: Take 0.125 packets (0.5 g total) by mouth daily. USE ONE SCOOP WITH JUICE AT BREAKFAST AND ADJUST DOSE AS NEEDED) 378 g 3    TYLENOL 8 HOUR 650 MG OR TBCR Take by mouth.           Soc Hx  Social History     Tobacco Use    Smoking status: Never    Smokeless tobacco: Never   Substance Use Topics    Alcohol use: No        Fam Hx  Family History   Problem Relation Age of Onset    Stroke Mother     Heart Disease Mother         cvd    Heart Disorder Father         mitral valve - from rhumatic fever    Gastro-Intestinal Disorder Daughter         hep c    Renal Disease Daughter         kidney stones    Heart Disease Maternal Grandfather         stroke    Infectious Disease Paternal Grandmother         poss. flu?    Cancer Paternal Grandfather         throat    Hypertension Brother     Seizure Disorder Brother         sleep apnea    Heart Disease Sister         PE    Heart Disease Daughter         pulm embolism    Hormone Disorder Daughter         obese    Cancer Other         M-AUNT-BREAST    Other (Emboli) Sister         Multiple    Other (Sleep Apnea) Brother         Severe    Breast Cancer Maternal Aunt 55        Age at dx 50's       Review of Systems  Comprehensive ROS reviewed and negative except for what's stated above.     OBJECTIVE:  /66 (BP Location: Right arm)   Pulse 71   Temp 97.1 °F (36.2 °C) (Temporal)   Resp 12   Ht 5' 6\" (1.676 m)   Wt 267 lb 4.8 oz (121.2 kg)   SpO2 95%   BMI 43.14 kg/m²     GEN: elderly female in NAD  HEENT: EOMI  Pulm: CTAB, no crackles or wheezes  CV: RRR, no murmurs  ABD: Soft,  non-tender, non-distended, +BS  SKIN: warm, dry  EXT: no edema    Diagnostic Data:    CBC/Chem    No results for input(s): \"RBC\", \"HGB\", \"HCT\", \"MCV\", \"MCH\", \"MCHC\", \"RDW\", \"NEPRELIM\", \"WBC\", \"PLT\" in the last 168 hours.    BASIC METABOLIC PANEL  Specimen: Blood  Component  Ref Range & Units 13 d ago Comments   Patient Fasting? No    Sodium  136 - 145 mmol/L 142    Potassium  3.5 - 5.2 mmol/L 4.5    Chloride  98 - 107 mmol/L 106    Carbon Dioxide  22.0 - 29.0 mmol/L 25.4    Blood Urea Nitrogen  6.0 - 20.0 mg/dL 11.0    Creatinine  0.5 - 0.9 mg/dL 0.75    BUN/CREAT Ratio  10.0 - 20.0 15.0    Glucose  74 - 109 mg/dL 101    Calcium  8.6 - 10.3 mg/dL 9.4    GFR CKD-EPI  >=60.00 mL/min/1.73 m² 75.52      COMPLETE BLOOD COUNT (CBC) WITH DIFFERENTIAL  Specimen: Blood  Component  Ref Range & Units 13 d ago   WBC  4.00 - 13.00 10^3/uL 8.67   RBC  3.80 - 5.10 10^6/uL 4.49   Hemoglobin  12.0 - 16.0 g/dL 14.8   Hematocrit  34.0 - 50.0 % 46.6   MCV  81.0 - 100.0 fL 103.8 High    MCH  27.0 - 33.2 pg 33.0   MCHC  31.0 - 37.0 g/dL 31.8   Platelet Count  150 - 450 10^3/uL 213   RDW  11.5 - 16.0 % 13.2   MPV  7.0 - 11.5 fL 9.8   Neutrophils Absolute  1.30 - 6.70 10ˆ3/µL 6.24   Lymphocytes Absolute  0.90 - 4.00 10ˆ3/µL 1.47   Monocytes Absolute  0.10 - 1.00 10ˆ3/µL 0.89   Eosinophils Absolute  0.00 - 0.30 10ˆ3/µL 0.04   Basophils Absolute  0.00 - 0.10 10ˆ3/µL 0.03   nRBC Absolute  0.000 - 0.012 10ˆ3/µL 0.000   Neutrophils %  % 71.9   Lymphocytes %  % 17.0   Monocytes %  % 10.3   Eosinophils %  % 0.5   Basophils %  % 0.3   nRBC/100 WBC  % 0.00       No results for input(s): \"GLU\", \"BUN\", \"CREATSERUM\", \"GFRAA\", \"GFRNAA\", \"EGFRCR\", \"CA\", \"NA\", \"K\", \"CL\", \"CO2\" in the last 168 hours.       No results for input(s): \"TROP\" in the last 168 hours.    Additional Diagnostics:     Radiology: No results found.

## 2024-02-13 NOTE — BRIEF OP NOTE
Pre-Operative Diagnosis: Endometrial cancer     Post-Operative Diagnosis: Endometrial cancer      Procedure Performed:   Robotic assisted total laparoscopic hysterectomy, bilateral salpingo-oophorectomy, staging    Surgeon(s) and Role:     * Avery Mora MD - Primary    Assistant(s):  Surgical Assistant.: Mariama Ramirez CSA  PA: Daphne Reddy PA     Surgical Findings: see full op report     Specimen: sent to pathology     Estimated Blood Loss: Blood Output: 50 mL (2/13/2024  9:42 AM)      Dictation Number:  TBD    JAX Martinez  2/13/2024  9:54 AM

## 2024-02-13 NOTE — ANESTHESIA PROCEDURE NOTES
Airway  Date/Time: 2/13/2024 7:39 AM  Urgency: Elective    Airway not difficult    General Information and Staff    Patient location during procedure: OR  Anesthesiologist: Rayo Barrios MD  Resident/CRNA: Gina Martinez CRNA  Performed: CRNA   Performed by: Gina Martinez CRNA  Authorized by: Rayo Barrios MD      Indications and Patient Condition  Indications for airway management: anesthesia  Sedation level: deep  Preoxygenated: yes  Patient position: sniffing  Mask difficulty assessment: 1 - vent by mask    Final Airway Details  Final airway type: endotracheal airway      Successful airway: ETT  Cuffed: yes   Successful intubation technique: Video laryngoscopy  Endotracheal tube insertion site: oral  Blade: Concha  Blade size: #3  ETT size (mm): 7.0    Cormack-Lehane Classification: grade I - full view of glottis  Placement verified by: capnometry   Cuff volume (mL): 10  Measured from: lips  ETT to lips (cm): 20  Number of attempts at approach: 1  Number of other approaches attempted: 0

## 2024-02-13 NOTE — H&P
[facility]: GYNECOLOGIC ONCOLOGY H&P     MEDICAL RECORD #: [id number] DATE OF SERVICE: 2/13/24             REASON FOR VISIT:   Endometrial cancer     HISTORY OF PRESENT ILLNESS:   Carolynn David is a 78 year old female who has had a recent history of Postmenopausal bleeding.  She experienced heavy bleeding with clotting and she held Eliquis several days.  She has h/o multiple ~3 D&C for PMB and endometrial polyps.  Progesterone did not control bleeding. Menopause age 41. No HRT.  Patient had pelvis ultrasound 11/16/2023 that showed Echogenic foci in the junctional zone, with a diffusely heterogeneous uterus.    EMBx 11/13/2023 showed endometrioid adenocarcinoma, FIGO grade 1.  H/o rectal cancer-villous rectal polyp with a focus of adeno CA. Follow up sigmoidoscopy in 1/21 showed Rectal scar with some residual polyp (adenoma) at the margin removed.  Patient was referred by Dr. Raphael Ash.     PAST MEDICAL HISTORY:       Past Medical History:   Diagnosis Date    ATRIAL FIBRILLATION      Carcinoma in situ in rectal polyp 08/2020    Diarrhea      GERD       Pt not currently on medication    HYPERTENSION      MENOPAUSE      Obesity, unspecified      LAZARO (obstructive sleep apnea)      Osteoarthritis      Skin cancer      Unspecified essential hypertension      Unspecified sleep apnea      Vertigo      Vitamin B 12 deficiency           SURGICAL HISTORY:         Past Surgical History:   Procedure Laterality Date    CHOLECYSTECTOMY   4/16     with negative cholangiogram    COLONOSCOPY   08/11/2020     8 cm lobulated villous appearing rectal polyp (pathology showed focus of adenocarcinoma), diverticulosis, random bx taken    COLONOSCOPY   04/12/2022     rectal scar, diverticulosis    COLONOSCOPY & POLYPECTOMY   9/8/05     diverticulosis, adenoma    COLONOSCOPY,DIAGNOSTIC   8/9/11     diverticulosis, random colon bx negativen;    D & C   2008     endometrial polyp    D & C   1990    ENDOSCOPIC ULTRASOUND  EXAM   4/10/16     acute cholecystitis without evidence of biliary ductal dilation  or CBD stones    HYSTEROSCOPY        OTHER SURGICAL HISTORY   2002     left knee torn meniscus    OTHER SURGICAL HISTORY   1989     sinus polyps    OTHER SURGICAL HISTORY   1965     hernia    OTHER SURGICAL HISTORY         hysteroscopy and removal of uterine polyp    OTHER SURGICAL HISTORY   5/16     endometrial polyps    SIGMOIDOSCOPY,DIAGNOSTIC   01/12/2021     Rectal scar with some residual polyp (adenoma) at the margin removed    TUBAL LIGATION   1969    UPPER GI ENDOSCOPY,DIAGNOSIS   9/8/05     wnl         ALLERGIES:     Cortisone               OTHER (SEE COMMENTS)    Comment:Flu like symptoms  Tuberculin Ppd          OTHER (SEE COMMENTS)    Comment:Severe flu like symptoms  Wine  [Alcohol]         SWELLING  Zoster Vaccine Live     OTHER (SEE COMMENTS)  Celebrex [Celecoxib]    DIARRHEA  Codeine [Morphine A*      Protonix [Pantopraz*    OTHER (SEE COMMENTS)    Comment:Dizziness, vision changes  Rythmol [Propafenon*         MEDICATIONS:  diphenoxylate-atropine 2.5-0.025 MG Oral Tab, TAKE 1 TABLET BY MOUTH FOUR TIMES DAILY AS NEEDED FOR DIARRHEA, Disp: 90 tablet, Rfl: 3  metoprolol succinate ER 25 MG Oral Tablet 24 Hr, TAKE 1 TABLET BY MOUTH EVERY DAY, Disp: 90 tablet, Rfl: 3  apixaban 5 MG Oral Tab, Take 1 tablet (5 mg total) by mouth 2 (two) times daily., Disp: 180 tablet, Rfl: 3  Cholestyramine (QUESTRAN) 4 GM/DOSE Oral Powder, Cholestyramine 1 packet or scoop orally with juice at breakfast and adjust dose as needed., Disp: 378 g, Rfl: 12  TYLENOL 8 HOUR 650 MG OR TBCR, Take by mouth., Disp: , Rfl:   ZYRTEC ALLERGY OR, , Disp: , Rfl:      No facility-administered encounter medications on file as of 1/31/2024.        GYNECOLOGIC HISTORY:  No LMP recorded. Patient is postmenopausal.  Patient has no history on file for sexual activity.     LAST PAP:   11/13/2023 NIL     PATHOLOGY:         Lab Results   Component Value Date      FINALDX   11/13/2023       Endometrium; biopsy:  - Endometrioid adenocarcinoma, FIGO grade 1  - Please see comment                TUMOR MARKERS:     No results found for: \"\"  No results found for: \"CEA\"  No results found for: \"\"        LAST SCREENING MAMMOGRAM:  6/13/2022     RADIOLOGY:  CT Chest/Abdomin/Pelvis: No results found for this or any previous visit from the past 365 days.     CT Abdomin + Pelvis: No results found for this or any previous visit from the past 365 days.     MRI Pelvis (soft tissue): No results found for this or any previous visit from the past 365 days.     PET Standard: No results found for this or any previous visit from the past 365 days.     US Vaginal Pelvic:No results found for this or any previous visit from the past 365 days.     CT Abdomen + Pelvis (contrast only): No results found for this or any previous visit from the past 365 days.     US Pelvis (transabdominal and transvaginal):   US PELVIS (TRANSABDOMINAL AND TRANSVAGINAL) (CPT=76856/81195) 11/16/2023     Narrative  DATE OF SERVICE: 11.16.2023  US PELVIS (TRANSABDOMINAL AND TRANSVAGINAL) (CPT=76856/46213)     CLINICAL INDICATION:  Postmenopausal bleeding.     COMPARISON: 1/27/2022 CT.     TECHNIQUE: Both transabdominal and endovaginal ultrasound examination of the pelvis was performed.     FINDINGS:     UTERUS:  The uterus is retroflexed and measures 7.3 x 5.6 x 3.8 cm. Total volume is 79.8 mL.  The myometrium is heterogeneous. There are nonshadowing echogenic foci in the region of the  junctional zone seen, without a discrete fibroid found.  The endometrial echo complex measures up to 4.0 mm. Nabothian cysts are present.     RIGHT OVARY:  Not visualized.        LEFT OVARY:  Not visualized.        There are no abnormal adnexal masses. There is no significant free fluid.     Impression  IMPRESSION:  1. Echogenic foci in the junctional zone, with a diffusely heterogeneous uterus. No shadowing is  identified. No  discrete fibroid is found.  2. Neither ovary is visualized on this exam.     IR Biopsy - ABD or Retro Mass: No results found for this or any previous visit from the past 365 days.           OBSTETRIC HISTORY:   OB History     T0    L3    SAB0  IAB0  Ectopic0  Multiple0  Live Births3      SOCIAL HISTORY:  Patient lives at [place], with self.     Ambulatory Status: rollator walker  Performance Status:  uses walker for ambulation. Self care with ADLs.      Social History    Socioeconomic History      Marital status:       Spouse name: Not on file      Number of children: Not on file      Years of education: Not on file      Highest education level: Not on file    Occupational History      Not on file    Tobacco Use      Smoking status: Never      Smokeless tobacco: Never    Vaping Use      Vaping Use: Never used    Substance and Sexual Activity      Alcohol use: No      Drug use: No      Sexual activity: Not on file    Other Topics      Concerns:        Not on file    Social History Narrative      Not on file     Social Determinants of Health  Financial Resource Strain: Not on file  Food Insecurity: Not on file  Transportation Needs: Not on file  Physical Activity: Not on file  Stress: Not on file  Social Connections: Not on file  Intimate Partner Violence: Not on file  Housing Stability: Not on file     FAMILY HISTORY:        Family History   Problem Relation Age of Onset    Stroke Mother      Heart Disease Mother           cvd    Heart Disorder Father           mitral valve - from rhumatic fever    Gastro-Intestinal Disorder Daughter           hep c    Renal Disease Daughter           kidney stones    Heart Disease Maternal Grandfather           stroke    Infectious Disease Paternal Grandmother           poss. flu?    Cancer Paternal Grandfather           throat    Hypertension Brother      Seizure Disorder Brother           sleep apnea    Heart Disease Sister           PE    Heart Disease Daughter            pulm embolism    Hormone Disorder Daughter           obese    Cancer Other           M-AUNT-BREAST    Other (Emboli) Sister           Multiple    Other (Sleep Apnea) Brother           Severe    Breast Cancer Maternal Aunt 55         Age at dx 50's         REVIEW OF SYSTEMS:  General ROS: negative  Ophthalmic ROS: positive for - uses glasses  ENT ROS: h/o sinus polyps removed  Cardiovascular ROS: follows cardiology for afib, htn, hld, h/o NANDA thrombus while on coumadin 2022. Currently taking Eliquis for AC. On beta blocker. EKG in November with Afib, nonspecific ST abnormalities.  Echo in October with mild regurgitation. Stress test in Dec -EF=>70%, evidence of myocardial ischemia.  Plan for angiography in future; not yet scheduled.    Respiratory ROS: LAZARO, does not like to wear cpap  Gastrointestinal ROS: h/o rectal cancer. Lomotil prn for loose stools.  Cramping with BM baseline; is on Questran.    Genito-Urinary ROS: treated with cephalexin in November for UTI.  Increased urgency/frequency, especially nocturnal. Baseline stress incontinence.  Musculoskeletal ROS: OA, received PT x3 sessions but pain worsened.   Neurological ROS: no TIA or stroke symptoms. Patient endorses 2 ischemic scars; likely past stroke/TIA; had workup with an internist.  Psychological ROS: negative  Endocrine ROS: negative  Hematological and Lymphatic ROS: negative     PHYSICAL EXAMINATION:  /82   Ht 5' 6\" (1.676 m)   Wt 276 lb (125.2 kg)   BMI 44.55 kg/m²   Body mass index is 44.55 kg/m².        GENERAL: alert and oriented, cooperative, in no acute distress  HEENT: extra ocular movement intact  THYROID: normal to inspection and palpation  LYMPH NODES: Cervical, supraclavicular, and axillary nodes normal.  LUNGS: clear to auscultation bilaterally  HEART: regular rate and rhythm  ABDOMEN:  well healed lap hardeep and tubal incision and soft, non-tender. Bowel sounds normal. No masses,  no organomegaly  EXTREMITIES:  extremities normal, atraumatic, no cyanosis or edema  NEUROLOGIC: motor grossly intact  VULVA: normal appearing vulva with no masses, tenderness or lesions   URETHRA: normal without discharge or scarring  VAGINA: normal mucosa without prolapse or lesions  CERVIX: multiparous appearance and no lesions  UTERUS: normal single, nontender  LEFT ADNEXA: non palpable  RIGHT ADNEXA:  non palpable  RECTOVAGINAL: deferred  BACK: symmetric, no curvature. ROM normal. No CVA tenderness.     ASSESSMENT:  Diagnoses and all orders for this visit:          Endometrial cancer (HCC)           78 year old female with Postmenopausal bleeding.  She experienced heavy bleeding with clotting and she held Eliquis several days.  She has h/o multiple ~3 D&C for PMB and endometrial polyps.  Progesterone did not control bleeding. Menopause age 41. No HRT.     Pelvis ultrasound 11/16/2023 that showed Echogenic foci in the junctional zone, with a diffusely heterogeneous uterus.      EMBx 11/13/2023 showed 7 cm uterus, endometrioid adenocarcinoma, FIGO grade 1.     H/o rectal cancer-villous rectal polyp with a focus of adeno Berkowitz situ - removed during colonoscopy. Follow up sigmoidoscopy in 1/21 showed Rectal scar with some residual polyp (adenoma) at the margin removed.     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 Recommend surgical intervention. Discussed possibility of needing adjuvant therapy after final pathology and stage confirmed.      Surgical intervention planned: Robotic assisted TLH/BSO/staging, possible Xlap        Patient counseled on Risk/Benefits/Alternatives/Indications risk.  Risk discussed includes but not limited to anesthesia, bleeding, death,  infection, injury to adjacent organs (including GI, , Vascular and Neurologic Structures), need for further operation, need for further therapy, transfusion, Venous Thrombolytic Disease, wound breakdown dehiscence, lymphedema, possible inability to stage, possible dissemination of disease.     Medical Clearance: obtained  Cardiac Clearance: obtained  Anti-Coagulation Addressed: yes, holding Eliquis held 4 days pre-op, not planning to bridge with lovenox, will hold Eliquis 2 weeks post op    Plan for SW/PT/OT consult in the hospital     Per Dr. Morgan sesay to resume water exercises 2 weeks after surgery     The patient verbalized good understanding of this.  I will keep you informed of her progress.  Thank you for allowing me to participate in the care of this patient.       Daphne Reddy PA-C

## 2024-02-14 VITALS
TEMPERATURE: 98 F | SYSTOLIC BLOOD PRESSURE: 148 MMHG | BODY MASS INDEX: 42.96 KG/M2 | HEIGHT: 66 IN | HEART RATE: 82 BPM | WEIGHT: 267.31 LBS | OXYGEN SATURATION: 99 % | DIASTOLIC BLOOD PRESSURE: 71 MMHG | RESPIRATION RATE: 18 BRPM

## 2024-02-14 LAB
ANION GAP SERPL CALC-SCNC: 5 MMOL/L (ref 0–18)
BASOPHILS # BLD AUTO: 0.01 X10(3) UL (ref 0–0.2)
BASOPHILS NFR BLD AUTO: 0.1 %
BUN BLD-MCNC: 8 MG/DL (ref 9–23)
BUN/CREAT SERPL: 11.9 (ref 10–20)
CALCIUM BLD-MCNC: 8.6 MG/DL (ref 8.7–10.4)
CHLORIDE SERPL-SCNC: 111 MMOL/L (ref 98–112)
CO2 SERPL-SCNC: 25 MMOL/L (ref 21–32)
CREAT BLD-MCNC: 0.67 MG/DL
DEPRECATED RDW RBC AUTO: 48 FL (ref 35.1–46.3)
EGFRCR SERPLBLD CKD-EPI 2021: 88 ML/MIN/1.73M2 (ref 60–?)
EOSINOPHIL # BLD AUTO: 0 X10(3) UL (ref 0–0.7)
EOSINOPHIL NFR BLD AUTO: 0 %
ERYTHROCYTE [DISTWIDTH] IN BLOOD BY AUTOMATED COUNT: 12.8 % (ref 11–15)
GLUCOSE BLD-MCNC: 99 MG/DL (ref 70–99)
HCT VFR BLD AUTO: 41.3 %
HGB BLD-MCNC: 13.3 G/DL
IMM GRANULOCYTES # BLD AUTO: 0.03 X10(3) UL (ref 0–1)
IMM GRANULOCYTES NFR BLD: 0.2 %
LYMPHOCYTES # BLD AUTO: 0.9 X10(3) UL (ref 1–4)
LYMPHOCYTES NFR BLD AUTO: 7.4 %
MCH RBC QN AUTO: 32.7 PG (ref 26–34)
MCHC RBC AUTO-ENTMCNC: 32.2 G/DL (ref 31–37)
MCV RBC AUTO: 101.5 FL
MONOCYTES # BLD AUTO: 0.75 X10(3) UL (ref 0.1–1)
MONOCYTES NFR BLD AUTO: 6.2 %
NEUTROPHILS # BLD AUTO: 10.45 X10 (3) UL (ref 1.5–7.7)
NEUTROPHILS # BLD AUTO: 10.45 X10(3) UL (ref 1.5–7.7)
NEUTROPHILS NFR BLD AUTO: 86.1 %
OSMOLALITY SERPL CALC.SUM OF ELEC: 290 MOSM/KG (ref 275–295)
PLATELET # BLD AUTO: 189 10(3)UL (ref 150–450)
POTASSIUM SERPL-SCNC: 3.8 MMOL/L (ref 3.5–5.1)
RBC # BLD AUTO: 4.07 X10(6)UL
SODIUM SERPL-SCNC: 141 MMOL/L (ref 136–145)
WBC # BLD AUTO: 12.1 X10(3) UL (ref 4–11)

## 2024-02-14 PROCEDURE — 97535 SELF CARE MNGMENT TRAINING: CPT

## 2024-02-14 PROCEDURE — 97530 THERAPEUTIC ACTIVITIES: CPT

## 2024-02-14 PROCEDURE — 97165 OT EVAL LOW COMPLEX 30 MIN: CPT

## 2024-02-14 PROCEDURE — 80048 BASIC METABOLIC PNL TOTAL CA: CPT | Performed by: PHYSICIAN ASSISTANT

## 2024-02-14 PROCEDURE — 85025 COMPLETE CBC W/AUTO DIFF WBC: CPT | Performed by: PHYSICIAN ASSISTANT

## 2024-02-14 PROCEDURE — 97116 GAIT TRAINING THERAPY: CPT

## 2024-02-14 PROCEDURE — 97161 PT EVAL LOW COMPLEX 20 MIN: CPT

## 2024-02-14 RX ORDER — SIMETHICONE 80 MG
80 TABLET,CHEWABLE ORAL 3 TIMES DAILY PRN
Qty: 60 TABLET | Refills: 0 | Status: SHIPPED | OUTPATIENT
Start: 2024-02-14

## 2024-02-14 RX ORDER — PSEUDOEPHEDRINE HCL 30 MG
100 TABLET ORAL 2 TIMES DAILY
Qty: 60 CAPSULE | Refills: 0 | Status: SHIPPED | OUTPATIENT
Start: 2024-02-14

## 2024-02-14 RX ORDER — ENOXAPARIN SODIUM 100 MG/ML
40 INJECTION SUBCUTANEOUS DAILY
Qty: 1.6 ML | Refills: 0 | Status: SHIPPED | OUTPATIENT
Start: 2024-02-15 | End: 2024-02-19

## 2024-02-14 RX ORDER — IBUPROFEN 600 MG/1
600 TABLET ORAL EVERY 6 HOURS PRN
Qty: 20 TABLET | Refills: 0 | Status: SHIPPED | OUTPATIENT
Start: 2024-02-14

## 2024-02-14 RX ORDER — ACETAMINOPHEN 325 MG/1
650 TABLET ORAL EVERY 4 HOURS PRN
Qty: 20 TABLET | Refills: 0 | Status: SHIPPED | OUTPATIENT
Start: 2024-02-14

## 2024-02-14 RX ORDER — ENOXAPARIN SODIUM 150 MG/ML
120 INJECTION SUBCUTANEOUS 2 TIMES DAILY
Qty: 19.2 ML | Refills: 0 | Status: SHIPPED | OUTPATIENT
Start: 2024-02-19 | End: 2024-03-02

## 2024-02-14 NOTE — DISCHARGE SUMMARY
[facility] Discharge Summary   Patient ID:  Carolynn David  [id number]  79 year old  2/14/1945    Admit date: 2/13/2024    Discharge date and time: 2/14/2024     Attending Physician: Avery Mora MD     Primary Care Physician: Joey Delgado MD     Admit Dx: Endometrial cancer  Endometrial cancer (HCC)      Discharge Diagnosis   Endometrial cancer sp robotic assisted total lap hysterectomy, afib, lazaro, htn    Discharged Condition: stable    Disposition: home    Important follow up:  Joey Delgado MD  [address]  [address]  [facility] [address]  [phone]    Follow up  As needed, hospital follow up    Daphne Beyer PA  [address]  [address]  [facility] [id number] [phone]    Follow up on 3/1/2024  3/1 at 9am for surgical follow up        Please refer to prior H&P on admission date.  hospital Course:     79 yo F with PMH of Afib on Eliquis, HTN, LAZARO and endometrial CA who presented for hysterectomy, BSO.     Endometrial CA  S/p robotic assisted total laparoscopic hysterectomy, BSO  - PRN pain meds, Transition to PO when able, wants to avoid narcotics given codeine allergy- d/w gyn onc - will write pain med rx for dc has been using only ibuprofen and only when encouraged avoiding pain meds   - monitor for acute blood loss anemia  - as per gyn onc      Afib  - off Eliquis   - continue metoprolol  - d/w gyn onc, plan to hold Eliquis for 2 weeks, plan to start prophylactic lovenox tomorrow then transition to therapeutic Lovenox in a few days if Hg stable- d/w Kassandra beyer dc on ppx dose w switch to full therapeutic on POD 6- she will write the rx,   cards f/u as routinely scheduled      HTN  - BP stable     LAZARO  - CPAP    Day of discharge Exam   Vitals:    02/14/24 1121   BP: 148/71   Pulse:    Resp: 18   Temp: 97.6 °F (36.4 °C)       Exam on day of discharge:  Gen: No acute distress  CV: RRR  Lungs: CTAB, good respiratory effort  Abdomen: s/nt/nd  Neuro: no focal deficits      Discharge  meds * of note not finalized- rx for lovenox, ibuprofen v tramadol to be done by gyne team     Medication List        CHANGE how you take these medications      Cholestyramine 4 GM/DOSE Powd  USE ONE SCOOP WITH JUICE AT BREAKFAST AND ADJUST DOSE AS NEEDED  What changed:   how much to take  when to take this  additional instructions     metoprolol succinate ER 25 MG Tb24  Commonly known as: Toprol XL  TAKE ONE TABLET BY MOUTH ONCE DAILY  What changed:   how much to take  how to take this  when to take this            CONTINUE taking these medications      diphenoxylate-atropine 2.5-0.025 MG Tabs  Commonly known as: Lomotil  TAKE ONE TABLET BY MOUTH TWICE DAILY AS NEEDED FOR DIARRHEA     Tylenol 8 Hour 650 MG Tbcr  Generic drug: Acetaminophen ER            ASK your doctor about these medications      apixaban 5 MG Tabs  Commonly known as: Eliquis              Consults: IP CONSULT TO FAMILY/INTERNAL MED  IP CONSULT TO RESPIRATORY CARE  IP CONSULT TO SOCIAL WORK    Radiology: No results found.     Operative Procedures: Procedure(s) (LRB):  Robotic assisted total laparoscopic hysterectomy, bilateral salpingo-oophorectomy, staging (N/A)  XI ROBOT-ASSISTED LAPAROSCOPIC OVARIAN CYSTECTOMY/ SALPINGO-OOPHORECTOMY (Bilateral)       Total Time Coordinating Care: > than 30 minutes    Patient had opportunity to ask questions and state understand and agree with therapeutic plan as outlined      Rachna Richey MD  [facility] Hospitalist  [phone]  2/14/2024  10:25 AM

## 2024-02-14 NOTE — DISCHARGE INSTRUCTIONS
Hold Eliquis after surgery.    Take 40 mg of Lovenox once a day from 2/15/24 to 2/18/24    Take 120 mg of Lovenox twice a day from 2/19/24 to 3/1/24

## 2024-02-14 NOTE — PROGRESS NOTES
[facility] POST-OPERATIVE VISIT     MEDICAL RECORD #: [id number] DATE OF SERVICE: 2/14/2024             Reason for visit:  Post-operative day one    HISTORY OF PRESENT ILLNESS:  79 year old female with a history ofpostmenopausal bleeding. She had an ultrasound which showed diffusely heterogenous uterus. Endometrial biopsy showed a FIGO grade 1 endometrial cavity. The patient was subsequently referred to me. She has atrial fibrillation and left atrial appendage thrombus and is on Eliquis, currently holding for surgery .  She underwent Robotic-assisted total laparoscopic hysterectomy and bilateral salpingo-oophorectomy, bilateral cervical injection of ICG dye, and bilateral sentinel lymph node dissection on 2/13/24.    GI//GYNE Complaint:   Other complaints: Tolerating diet. S/p BM. Pending void. Ambulating well. Pain controlled.    Interim History:  Underwent surgery    Patient's medications, allergies, past medical, surgical, social and family histories were reviewed and updated as appropriate.    Pathology/Lab Results:  Recent Results (from the past 720 hour(s))   ABORH (Blood Type)    Collection Time: 02/09/24  1:12 PM   Result Value Ref Range    ABO BLOOD TYPE O     RH BLOOD TYPE Positive    Antibody Screen    Collection Time: 02/09/24  1:12 PM   Result Value Ref Range    Antibody Screen Negative    ABORH Confirmation    Collection Time: 02/09/24  1:18 PM   Result Value Ref Range    ABO BLOOD TYPE O     RH BLOOD TYPE Positive    Cytology fluids    Collection Time: 02/13/24  8:32 AM   Result Value Ref Range    Case Report       Non-Gynecologic Cytology                          Case: [id number]                                   Authorizing Provider:  Avery Mora MD         Collected:           02/13/2024 08:32 AM          Ordering Location:     [facility]          Received:            02/13/2024 10:16 AM                                 Operating Room                                                                Pathologist:           Mari Nguyen MD                                                             Specimen:    Pelvic washings, 1. pelvic washings                                                        General Categorization         Benign, inflammatory, reactive or reparative changes    Final Diagnosis:       Pelvic washings:  Adequacy: Satisfactory for evaluation  General Category: Benign, inflammatory, reactive, or reparative changes  Diagnosis:  Reactive mesothelial cells present  Lymphocytes present, few  No malignant cells identified      Embedded Images      Procedure       Monolayers:  1  Cytospins:     2      Clinical Information       Endometrial Cancer.          Non Gyne Interpretation  Benign      Gross Description       Volume (ml): 30    Color: Clear     Basic Metabolic Panel (8)    Collection Time: 02/14/24  5:47 AM   Result Value Ref Range    Glucose 99 70 - 99 mg/dL    Sodium 141 136 - 145 mmol/L    Potassium 3.8 3.5 - 5.1 mmol/L    Chloride 111 98 - 112 mmol/L    CO2 25.0 21.0 - 32.0 mmol/L    Anion Gap 5 0 - 18 mmol/L    BUN 8 (L) 9 - 23 mg/dL    Creatinine 0.67 0.55 - 1.02 mg/dL    BUN/CREA Ratio 11.9 10.0 - 20.0    Calcium, Total 8.6 (L) 8.7 - 10.4 mg/dL    Calculated Osmolality 290 275 - 295 mOsm/kg    eGFR-Cr 88 >=60 mL/min/1.73m2   CBC W/ DIFFERENTIAL    Collection Time: 02/14/24  5:47 AM   Result Value Ref Range    WBC 12.1 (H) 4.0 - 11.0 x10(3) uL    RBC 4.07 3.80 - 5.30 x10(6)uL    HGB 13.3 12.0 - 16.0 g/dL    HCT 41.3 35.0 - 48.0 %    .5 (H) 80.0 - 100.0 fL    MCH 32.7 26.0 - 34.0 pg    MCHC 32.2 31.0 - 37.0 g/dL    RDW-SD 48.0 (H) 35.1 - 46.3 fL    RDW 12.8 11.0 - 15.0 %    .0 150.0 - 450.0 10(3)uL    Neutrophil Absolute Prelim 10.45 (H) 1.50 - 7.70 x10 (3) uL    Neutrophil Absolute 10.45 (H) 1.50 - 7.70 x10(3) uL    Lymphocyte Absolute 0.90 (L) 1.00 - 4.00 x10(3) uL    Monocyte Absolute 0.75 0.10 - 1.00 x10(3) uL    Eosinophil Absolute 0.00 0.00 -  0.70 x10(3) uL    Basophil Absolute 0.01 0.00 - 0.20 x10(3) uL    Immature Granulocyte Absolute 0.03 0.00 - 1.00 x10(3) uL    Neutrophil % 86.1 %    Lymphocyte % 7.4 %    Monocyte % 6.2 %    Eosinophil % 0.0 %    Basophil % 0.1 %    Immature Granulocyte % 0.2 %       Labs reviewed    Physical examination:    /65 (BP Location: Right arm)   Pulse 71   Temp 98 °F (36.7 °C) (Oral)   Resp 19   Ht 5' 6\" (1.676 m)   Wt 267 lb 4.8 oz (121.2 kg)   SpO2 95%   BMI 43.14 kg/m²     GENERAL: alert and oriented, cooperative, in no acute distress  ABDOMEN: soft, non-tender. Bowel sounds normal. No masses,  no organomegaly  INCISION/SURGICAL WOUNDS: clean, dry and intact  PELVIC: Pelvic exam: examination not indicated.    ASSESSMENT:  Dx:  Uterine cancer    WBC 12.1, afebrile, likely reactive    Hgb 13.3    Cr 0.67    Vitals stable    PLAN:    Hold eliquis x 2 weeks. Pt will go home on lovenox, teaching provided  -LMWH discussed with hospitalist, pt has low chadvsc score therefore it is reasonable to continue prophylactic dose through POD 5 and start therapeutic dose on POD 6    PT/OT/SW on for discharge planning    No lifting greater than 10lbs, no tub bathes/swimming, no driving, nothing in the vagina.    Discharge pending void    The patient verbalized good understanding of this.  I will keep you informed of her progress.  Thank you for allowing me to participate in the care of this patient.    JAX Martinez  02/14/24

## 2024-02-14 NOTE — CM/SW NOTE
02/14/24 1300   CM/SW Referral Data   Referral Source Social Work (self-referral)   Reason for Referral Discharge planning   Informant Patient   Medical Hx   Does patient have an established PCP? Yes  (Joey Delgado)   Patient Info   Patient's Current Mental Status at Time of Assessment Alert;Oriented   Patient's Home Environment House   Number of Levels in Home 1   Number of Stair in Home 2 steps outside   Patient lives with Alone   Patient Status Prior to Admission   Independent with ADLs and Mobility No  (Ambulates with a cane and walker, has grab bars in bathroom and bedroom)   Pt. requires assistance with Ambulating   Discharge Needs   Anticipated D/C needs To be determined       MDO referral for DC planning. [facility] intern met with pt. Above assessment completed. Pt is home alone. Pt uses a cane and walker to ambulate at baseline. Pt has grab bars in bathroom and bedroom. Pt has an adjustable bed and toilet seat riser. Pt is not on O2. Pt still drives. Pt was offered  services but declined.     Plan: Return home    [facility] Yancey Intern, MSW candidate.

## 2024-02-14 NOTE — CM/SW NOTE
Department  notified of request for HHC, aidin referrals started. Assigned CM/SW to follow up with pt/family on further discharge planning.     Meliza Fan, DSC

## 2024-02-14 NOTE — PLAN OF CARE
Pt a/o x 4, vss. Pt ambulating in room per self.  Pt c/o some cramping to abd. Pt bennett was pulled out @ 0630 and voiding c/y urine. Discussed POC, discharge instructions. Pt verbalizing understanding of follow up care. Lovenox injection teaching provided, pt administered AM injection. Pt states she feels comfortable administering injections.  IV removed and pt discharged home with daughter in law.   Problem: Patient Centered Care  Goal: Patient preferences are identified and integrated in the patient's plan of care  Description: Interventions:  - What would you like us to know as we care for you?   - Provide timely, complete, and accurate information to patient/family  - Incorporate patient and family knowledge, values, beliefs, and cultural backgrounds into the planning and delivery of care  - Encourage patient/family to participate in care and decision-making at the level they choose  - Honor patient and family perspectives and choices  Outcome: Adequate for Discharge     Problem: Patient/Family Goals  Goal: Patient/Family Long Term Goal  Description: Patient's Long Term Goal:     Interventions:  -   - See additional Care Plan goals for specific interventions  Outcome: Adequate for Discharge  Goal: Patient/Family Short Term Goal  Description: Patient's Short Term Goal:     Interventions:   -   - See additional Care Plan goals for specific interventions  Outcome: Adequate for Discharge

## 2024-02-14 NOTE — OCCUPATIONAL THERAPY NOTE
OCCUPATIONAL THERAPY EVALUATION - INPATIENT     Room Number: 442/442-A  Evaluation Date: 2/14/2024  Type of Evaluation: Initial  Presenting Problem: endometrial CA    Physician Order: IP Consult to Occupational Therapy  Reason for Therapy: ADL/IADL Dysfunction and Discharge Planning    OCCUPATIONAL THERAPY ASSESSMENT   Patient is a 79 year old female admitted 2/13/2024 for endometrial CA.  Prior to admission, patient's baseline is modified. Pt lives alone and mages her care with use of a rollator for mobility and adaptations within her home for accessibility.   Patient is currently functioning below baseline with toileting, bathing, lower body dressing, bed mobility, transfers, functional standing tolerance, and performing household tasks.  Patient is requiring minimal assist as a result of the following impairments: decreased functional reach and pain. Occupational Therapy will continue to follow for duration of hospitalization.    Patient will benefit from continued skilled OT Services while hospitalized, anticipating home with the initial support of her family    PLAN  OT Treatment Plan: ADL training;Functional transfer training;Endurance training;Patient/Family education;Patient/Family training;Equipment eval/education;Compensatory technique education     OCCUPATIONAL THERAPY MEDICAL/SOCIAL HISTORY   Problem List  Active Problems:    Endometrial cancer (HCC)    HOME SITUATION  Type of Home: House  Home Layout: One level  Lives With: Alone (large supportive family in the area)  Toilet and Equipment: Comfort height toilet  Other Equipment: -- (rollator)  Occupation/Status: retired special   Drives: Yes  Patient Regularly Uses: Glasses    Stairs in Home: 2  Use of Assistive Device(s): rollator    Prior Level of [place]: pt lives alone and manages ADLs and functional mobility modified independent.     SUBJECTIVE  \"I have a big family\"    OCCUPATIONAL THERAPY EXAMINATION    OBJECTIVE  Precautions:  Abdominal protective strategies  Fall Risk: Standard fall risk    PAIN ASSESSMENT  Ratin  Location: incisional  Management Techniques: Relaxation; Body mechanics    ACTIVITY TOLERANCE  Good, no SOB    ACTIVITIES OF DAILY LIVING ASSESSMENT  AM-PAC ‘6-Clicks’ Inpatient Daily Activity Short Form  How much help from another person does the patient currently need…  -   Putting on and taking off regular lower body clothing?: A Lot  -   Bathing (including washing, rinsing, drying)?: A Little  -   Toileting, which includes using toilet, bedpan or urinal? : A Little  -   Putting on and taking off regular upper body clothing?: A Little  -   Taking care of personal grooming such as brushing teeth?: None  -   Eating meals?: None    AM-PAC Score:  Score: 19  Approx Degree of Impairment: 42.8%  Standardized Score (AM-PAC Scale): 40.22  CMS Modifier (G-Code): CK    FUNCTIONAL TRANSFER ASSESSMENT  Sit to Stand: Edge of Bed; Chair  Edge of Bed: Supervision  Chair: Supervision    BED MOBILITY  Rolling: Supervision  Supine to Sit : Minimal Assist  Sit to Supine (OT): Not Tested      FUNCTIONAL ADL ASSESSMENT  UE self care: set up in sitting  LE self care: Mod A; pt owns LHAE but was not using PTA    EDUCATION PROVIDED  Patient: Role of Occupational Therapy; Discharge Recommendations; Plan of Care; DME Recommendations; Functional Transfer Techniques; Compensatory ADL Techniques  Patient's Response to Education: Verbalized Understanding; Returned Demonstration    The patient's Approx Degree of Impairment: 42.8% has been calculated based on documentation in the [facility] '6 clicks' Inpatient Daily Activity Short Form.  Research supports that patients with this level of impairment may benefit from [facility] and additional family support.  Final disposition will be made by interdisciplinary medical team.     Patient End of Session: Up in chair;Needs met;Call light within reach;RN aware of session/findings;All patient questions and concerns  addressed    OT Goals  Patients self stated goal is: return to PLOF     Patient will complete functional transfer with modified independent  Comment:     Patient will complete toileting with modified independent  Comment:     Patient will tolerate standing for 4-5 minutes in prep for adls with modified independence   Comment:    Patient will complete item retrieval with UE/LE dressing utilizing LHAE as indicated  Comment:          Goals  on: 24  Frequency: 3-5x/week    Patient Evaluation Complexity Level:   Occupational Profile/Medical History MODERATE - Expanded review of history including review of medical or therapy record   Specific performance deficits impacting engagement in ADL/IADL LOW  1 - 3 performance deficits    Client Assessment/Performance Deficits LOW - No comorbidities nor modifications of tasks    Clinical Decision Making LOW - Analysis of occupational profile, problem-focused assessments, limited treatment options    Overall Complexity LOW     Self-Care Home Management: 20 minutes

## 2024-02-14 NOTE — PHYSICAL THERAPY NOTE
PHYSICAL THERAPY EVALUATION - INPATIENT     Room Number: 442/442-A  Evaluation Date: 2/14/2024  Type of Evaluation: Initial   Physician Order: PT Eval and Treat    Presenting Problem: Robotic assisted total laparoscopic hysterectomy, bilateral salpingo-oophorectomy, staging 2/13/24  Co-Morbidities : Afib, GERD, HTN, OA, IBS, rectal polyp carcinoma, skin cancer, visual impairment, vertigo  Reason for Therapy: Mobility Dysfunction and Discharge Planning    PHYSICAL THERAPY ASSESSMENT   Patient is a 79 year old female admitted 2/13/2024 for Robotic assisted total laparoscopic hysterectomy, bilateral salpingo-oophorectomy, staging. Prior to admission, patient's baseline is modified independent with rolling walker.  Patient is currently functioning near baseline with bed mobility, transfers, and gait.  Patient is requiring supervision and stand-by assist as a result of the following impairments: pain.  Physical Therapy will continue to follow for duration of hospitalization.    Patient will benefit from continued skilled PT Services at discharge to promote functional independence in home.  Anticipate patient will return home with home health PT.     PLAN  PT Treatment Plan: Bed mobility;Energy conservation;Endurance;Body mechanics;Gait training;Strengthening;Stair training;Transfer training  Rehab Potential : Good  Frequency (Obs): 5x/week    PHYSICAL THERAPY MEDICAL/SOCIAL HISTORY     Problem List  Active Problems:    Endometrial cancer (HCC)      HOME SITUATION  Home Situation  Type of Home: House  Home Layout: One level  Stairs to Enter : 2  Railing: Yes  Lives With: Alone (supportive family in the area)  Patient Owned Equipment: Rollator (elevating recliner, adjustable bed, portable grab bar, elevated toilet seat on comfort level toilet)  Patient Regularly Uses: Glasses;Hearing aides     Prior Level of [place]: modified independent with rolling walker, has an elevated toilet seat and has recently modified her  home with adaptive equipment    SUBJECTIVE  \"I don't want my family to help, they are in their 60s\"     PHYSICAL THERAPY EXAMINATION   OBJECTIVE  Precautions: Abdominal protective strategies  Fall Risk: Standard fall risk    WEIGHT BEARING RESTRICTION  Weight Bearing Restriction: None                PAIN ASSESSMENT  Rating: Other (Comment) (soreness)  Location: abd       COGNITION  Overall Cognitive Status:  WFL - within functional limits    RANGE OF MOTION AND STRENGTH ASSESSMENT  Upper extremity ROM and strength are within functional limits  BUE  Lower extremity ROM is within functional limits  BLE  Lower extremity strength is within functional limits  not tested 2/2 surgical limitations     BALANCE  Static Sitting: Fair +  Dynamic Sitting: Fair  Static Standing: Fair  Dynamic Standing: Fair -        ACTIVITY TOLERANCE  Pulse: 82  Heart Rate Source: Monitor     BP: 123/59  BP Location: Right arm  BP Method: Automatic  Patient Position: Lying    O2 WALK  Oxygen Therapy  SPO2% on Room Air at Rest: 97    AM-PAC '6-Clicks' INPATIENT SHORT FORM - BASIC MOBILITY  How much difficulty does the patient currently have...  Patient Difficulty: Turning over in bed (including adjusting bedclothes, sheets and blankets)?: None   Patient Difficulty: Sitting down on and standing up from a chair with arms (e.g., wheelchair, bedside commode, etc.): None   Patient Difficulty: Moving from lying on back to sitting on the side of the bed?: A Little   How much help from another person does the patient currently need...   Help from Another: Moving to and from a bed to a chair (including a wheelchair)?: A Little   Help from Another: Need to walk in hospital room?: A Little   Help from Another: Climbing 3-5 steps with a railing?: A Little     AM-PAC Score:  Raw Score: 20   Approx Degree of Impairment: 35.83%   Standardized Score (AM-PAC Scale): 47.67   CMS Modifier (G-Code): CJ    FUNCTIONAL ABILITY STATUS  Functional Mobility/Gait  Assessment  Gait Assistance: Contact guard assist (progressed to SBA)  Distance (ft): 150  Assistive Device: Rolling walker  Pattern: Within Functional Limits;Comment (moderate chano, slightly kyphotic, step through pattern with dec step length bilat, moderate UE reliance on RW)  Stairs: Other (comment) (pt refused)  Rolling: NT   Supine to Sit: stand-by assist with elevated head of bed - pt bed at home can elevate   Sit to Stand: stand-by assist from edge of bed, standard bedside chair with rolling walker, cues for hand placement, dec eccentric control with sitting   ADDITIONAL SESSION DETAIL    Pt appeared slightly anxious with movement, limited by arthritis in knees and shoulder, would benefit from 1-2 more sessions for stair training prior to d/c home.     Patient received supine in bed, agreeable to physical therapy evaluation. Vital signs monitored as noted above, no adverse symptoms and patient stable during session. Education with pt provided on physiological benefits of out of bed mobility. Next session anticipate to progress bed mobility, transfers, gait, and stair negotiation.    Patient history and/or personal factors that may impact the plan of care include home accessibility concerns and limited social support. Based on the physical therapy examination of the noted systems and functional activity/participation limitations, the patient presentation is stable given the patient demonstrates no significant barriers to meeting therapy goals.    Exercise/Education Provided:  Bed mobility  Body mechanics  Energy conservation  Functional activity tolerated  Posture  Transfer training    The patient's Approx Degree of Impairment: 35.83% has been calculated [facility] Inpatient Basic Mobility Short Form.  Research supports that patients with this level of impairment may benefit from home with no needs, however pt would benefit from home-health PT as pt lives alone and has  stairs to navigate.    Final disposition will be made by interdisciplinary medical team.    Patient End of Session: Up in chair;Needs met;Call light within reach;RN aware of session/findings;All patient questions and concerns addressed;Other (Comment) (handoff to OT)    CURRENT GOALS  Goals to be met by: 2.12.24  Patient Goal Patient's self-stated goal is: to go home   Goal #1 Patient is able to demonstrate supine - sit EOB @ level: modified independent     Goal #1   Current Status    Goal #2 Patient is able to demonstrate transfers Sit to/from Stand at assistance level: independent with walker - rolling     Goal #2  Current Status    Goal #3 Patient is able to ambulate 300 feet with assist device: walker - rolling at assistance level: modified independent   Goal #3   Current Status    Goal #4 Patient will negotiate 2 stairs/one curb w/ assistive device and supervision   Goal #4   Current Status    Goal #5 Patient to demonstrate independence with home activity/exercise instructions provided to patient in preparation for discharge.   Goal #5   Current Status    Goal #6    Goal #6  Current Status      Patient Evaluation Complexity Level:  History High - 3 or more personal factors and/or co-morbidities   Examination of body systems Moderate - addressing a total of 3 or more elements   Clinical Presentation Low- Stable   Clinical Decision Making  Low Complexity     Gait Training: 15 minutes  Therapeutic Activity:  15 minutes    KEENAN Quiles  [facility]  DPT Candidate 2024     I provided clinical instruction and supervision for the duration of this session and agree with the above documentation.    Renee Parks, PT, DPT  [facility]

## (undated) DEVICE — SYSTEM ACCS W/ 120MM OBT 5MM PRT LP BLDELSS

## (undated) DEVICE — GLOVE GAMMEX PI HYBRID LF 7.5

## (undated) DEVICE — FORCEPS ROBOTIC DIA8MM BPLR FEN XI ENDOWRIST

## (undated) DEVICE — SEAL CANN 5-8MM FOR DA VINCI XI/X ROBOTIC SYS

## (undated) DEVICE — SOLUTION IRRIG 1000ML 0.9% NACL USP BTL

## (undated) DEVICE — TRAY PREP WET SKIN 4 COMPARTMENT 6 SPNG 2 TWL

## (undated) DEVICE — Device

## (undated) DEVICE — CONTAINER SPEC 4OZ POLYPR GRAD SCR LID LEAK

## (undated) DEVICE — HANDLE SUR BLU PLAS LT FLX SLIP ON ST DISP

## (undated) DEVICE — FORCEPS ROBOTIC 8MM PROGRASP ENDOWRIST DA

## (undated) DEVICE — DRAPE EQUIP CLMN ROBOTIC DISP DA VINCI XI

## (undated) DEVICE — JELLY LUB 2OZ GREASELESS FLIP TOP DISP

## (undated) DEVICE — COVER TIP FOR HOT SHR INSTR DAVINCI ENDOWRIST

## (undated) DEVICE — PACK CDS ROBOTIC

## (undated) DEVICE — DRAPE EXT W21XH19XL10.5IN DA VINCI XI

## (undated) DEVICE — ADHESIVE SKIN TOP FOR WND CLSR DERMBND ADV

## (undated) DEVICE — SUTURE MCRYL 4-0 18IN ABSRB UD 19MM PS-2 3/8

## (undated) DEVICE — SUTURE V-LOC 180 SZ 0 L30CM ABSRB GRN L37MM

## (undated) DEVICE — TRAP MCS 40ML 5IN PLS SCR CAP

## (undated) DEVICE — PAD POS 36IN DISP SURGYPAD

## (undated) DEVICE — LUBRICANT ELECTRD 4ML ANTISTICK SOL W/ FOAM

## (undated) DEVICE — DRAPE INCIS W17XL23IN FAB ANTIMIC FULL W HNDL

## (undated) DEVICE — LAPAROVUE VISIBILITY SYS

## (undated) DEVICE — DRAPE SURG W109XL110IN UNIV STD ABD/PELV 100%

## (undated) DEVICE — SET SMK EVAC TB TRILUMEN FLTR ACT CHAR FLTR

## (undated) DEVICE — SCISSORS SUR 8MM MPLR CRV ENDOWRIST DA VINCI

## (undated) DEVICE — OBTURATOR ROBOTIC DIA8MM STD OPT BLDELSS

## (undated) DEVICE — SOLUTION IV 1000ML 0.9% NACL PRESERVATIVE

## (undated) DEVICE — GLOVE GAMMEX PI HYBRID LF 8.0

## (undated) DEVICE — SYSTEM SEMI RIG LNR 3000CC W/ EL AND SELF